# Patient Record
Sex: FEMALE | Race: BLACK OR AFRICAN AMERICAN
[De-identification: names, ages, dates, MRNs, and addresses within clinical notes are randomized per-mention and may not be internally consistent; named-entity substitution may affect disease eponyms.]

---

## 2018-04-07 ENCOUNTER — HOSPITAL ENCOUNTER (EMERGENCY)
Dept: HOSPITAL 74 - JER | Age: 28
Discharge: HOME | End: 2018-04-07
Payer: COMMERCIAL

## 2018-04-07 VITALS — SYSTOLIC BLOOD PRESSURE: 141 MMHG | TEMPERATURE: 98.9 F | DIASTOLIC BLOOD PRESSURE: 77 MMHG | HEART RATE: 86 BPM

## 2018-04-07 VITALS — BODY MASS INDEX: 28.7 KG/M2

## 2018-04-07 DIAGNOSIS — R35.0: ICD-10-CM

## 2018-04-07 DIAGNOSIS — O26.892: Primary | ICD-10-CM

## 2018-04-07 DIAGNOSIS — Z3A.18: ICD-10-CM

## 2018-04-07 LAB
APPEARANCE UR: CLEAR
BILIRUB UR STRIP.AUTO-MCNC: NEGATIVE MG/DL
COLOR UR: (no result)
KETONES UR QL STRIP: NEGATIVE
LEUKOCYTE ESTERASE UR QL STRIP.AUTO: NEGATIVE
NITRITE UR QL STRIP: NEGATIVE
PH UR: 8 [PH] (ref 5–8)
PROT UR QL STRIP: NEGATIVE
PROT UR QL STRIP: NEGATIVE
RBC # UR STRIP: NEGATIVE /UL
SP GR UR: 1.01 (ref 1–1.03)
UROBILINOGEN UR STRIP-MCNC: NEGATIVE MG/DL (ref 0.2–1)

## 2018-04-07 NOTE — PDOC
History of Present Illness





- General


History Source: Patient





- History of Present Illness


Timing/Duration: reports: other


Abdominal Pain Onset Location: reports: suprapubic





<Lilia Ruiz  Last Filed: 18 16:09>





<Lefty Ham - Last Filed: 18 15:01>





- General


Chief Complaint: Pain, Acute


Stated Complaint: 18 WEEKS PREGNANT/PAIN


Time Seen by Provider: 18 14:58





Past History





- Past Medical History


COPD: No


Other medical history: DENIES.





- Suicide/Smoking/Psychosocial Hx


Smoking History: Never smoked





<Lilia Ruiz  Last Filed: 18 16:09>





<Lefty Ham - Last Filed: 18 15:01>





- Past Medical History


Allergies/Adverse Reactions: 


 Allergies











Allergy/AdvReac Type Severity Reaction Status Date / Time


 


Penicillins Allergy Intermediate Hives Verified 18 13:25











Home Medications: 


Ambulatory Orders





NK [No Known Home Medication]  18 











**Review of Systems





- Review of Systems


Constitutional: No: Chills, Fever


ABD/GI: Yes: Abdominal cramping.  No: Constipated, Diarrhea, Nausea, Vomiting


: Yes: Frequency.  No: Burning, Dysuria, Discharge, Flank Pain, Hematuria





<Lilia Ruiz  Last Filed: 18 16:09>





*Physical Exam





- Vital Signs


 Last Vital Signs











Temp Pulse Resp BP Pulse Ox


 


 98.9 F   86   19   141/77   100 


 


 18 13:25  18 13:25  18 13:25  18 13:25  18 13:25














- Physical Exam


General Appearance: Yes: Appropriately Dressed.  No: Apparent Distress


HEENT: positive: Normal Voice


Neck: positive: Supple


Respiratory/Chest: negative: Respiratory Distress


Female Pelvic Exam: positive: other (refused pelvic)


Gastrointestinal/Abdominal: positive: Soft.  negative: Tender


Integumentary: positive: Dry, Warm


Neurologic: positive: Fully Oriented, Alert, Normal Mood/Affect





<Lilia Ruiz  Last Filed: 18 16:09>





- Vital Signs


 Last Vital Signs











Temp Pulse Resp BP Pulse Ox


 


 98.9 F   86   19   141/77   100 


 


 18 13:25  04/07/18 13:25  18 13:25  18 13:25  18 13:25














<Lefty Ham - Last Filed: 18 15:01>





ED Treatment Course





- ADDITIONAL ORDERS


Additional order review: 














 18 15:04 Urine Culture - Final





 Urine - Urine Clean Catch    NO GROWTH OBTAINED














- Medications


Given in the ED: 


ED Medications














Discontinued Medications














Generic Name Dose Route Start Last Admin





  Trade Name Trever  PRN Reason Stop Dose Admin


 


Acetaminophen  650 mg  18 15:03  18 15:12





  Tylenol -  PO  18 15:04  650 mg





  ONCE ONE   Administration














<Lefty Ham - Last Filed: 18 15:01>





Medical Decision Making





- Medical Decision Making





18 15:01


27-year-old female, , approximately 18 weeks by dates, has had prenatal 

care w/ US and no issues with pregnancy so far per pt, here with urinary 

frequency x 2 days.  Abdominal cramping pain radiating to back recorded at 

triage though patient denies this to me at this time.  No dysuria, vaginal 

bleeding, nausea, vomiting, fever or chills  





See exam





Urinary frequency in 2nd trimester preg


Has had prenatal care including US w/ no issues w/ preg so far per pt


Well nir and stable w/ benign abd, declines pelvic exam


-ua/cx pending











18 16:07


Urine negative for signs of infection.  Urine culture sent.  Patient feels well 

enough to be discharged with partner.  To contact her GYN on Monday. 











<Lilia Ruiz - Last Filed: 18 16:09>





- Medical Decision Making








18 15:01


The patient was seen and evaluated in conjunction with CAROLE Ruiz under my direct 

supervision, ancillary studies were reviewed.   I agree with the plan as 

outlined by CAROLE Ruiz. 





<Lefty Ham - Last Filed: 18 15:01>





*DC/Admit/Observation/Transfer





<Lilia Ruiz - Last Filed: 18 16:09>





<Lefty Ham - Last Filed: 18 15:01>


Diagnosis at time of Disposition: 


 Urinary frequency








- Discharge Dispostion


Disposition: HOME


Condition at time of disposition: Good





- Referrals


Referrals: 


Josiah Maria MD [Primary Care Provider] - 





- Patient Instructions


Printed Discharge Instructions:  Managing Symptoms of Pregnancy


Additional Instructions: 


Some urinary frequency is to be expected in pregnancy state as your uterus is 

resting on your bladder.


Your urine showed no signs of infection today.


Please follow-up with her GYN on Monday





- Post Discharge Activity

## 2018-11-26 ENCOUNTER — HOSPITAL ENCOUNTER (EMERGENCY)
Dept: HOSPITAL 74 - JERFT | Age: 28
Discharge: HOME | End: 2018-11-26
Payer: COMMERCIAL

## 2018-11-26 VITALS — BODY MASS INDEX: 30.9 KG/M2

## 2018-11-26 VITALS — TEMPERATURE: 99 F | HEART RATE: 74 BPM | SYSTOLIC BLOOD PRESSURE: 123 MMHG | DIASTOLIC BLOOD PRESSURE: 82 MMHG

## 2018-11-26 DIAGNOSIS — Z3A.01: ICD-10-CM

## 2018-11-26 DIAGNOSIS — K08.89: ICD-10-CM

## 2018-11-26 DIAGNOSIS — O99.89: Primary | ICD-10-CM

## 2018-11-26 NOTE — PDOC
History of Present Illness





- General


Chief Complaint: Toothache


Stated Complaint: Toothache


Time Seen by Provider: 11/26/18 19:39





- History of Present Illness


Initial Comments: 





11/26/18 20:34


28-year-old female without comorbidities 7 weeks pregnant presents for 

evaluation of painful tooth for the last 7 days without systemic symptoms





Past History





- Past Medical History


Allergies/Adverse Reactions: 


 Allergies











Allergy/AdvReac Type Severity Reaction Status Date / Time


 


Penicillins Allergy   Verified 11/26/18 19:40











Home Medications: 


Ambulatory Orders





Clindamycin [Cleocin -] 300 mg PO TID #21 capsule 11/26/18 








COPD: No





- Suicide/Smoking/Psychosocial Hx


Smoking History: Never smoked


Hx Alcohol Use: No


Drug/Substance Use Hx: No


Substance Use Type: None





**Review of Systems





- Review of Systems


Constitutional: No: Fever


HEENTM: Yes: Dental Problems





*Physical Exam





- Vital Signs


 Last Vital Signs











Temp Pulse Resp BP Pulse Ox


 


 99.0 F   74   18   123/82   100 


 


 11/26/18 19:38  11/26/18 19:38  11/26/18 19:38  11/26/18 19:38  11/26/18 19:38














- Physical Exam


Comments: 





11/26/18 20:35


HEAD: NC/AT


EYES: Conjuntiva clear


Ears: Canals and TM's normal


NOSE: No d/c


THROAT: Moist mucous membrances, oral pharanx clear, uvula midline; 


Poor dentition, right lower molar caries


NECK: Supple without adenopathy


CARDIAC: S1 S2


LUNGS: CTA Full and Equal breath sounds


ABDOMEN: Soft NT ND


MS: Full ROM in all joints without edema 


NEUROLOGIC: No gross sensory or motor deficits, NVID


SKIN: Normal color and temperature no lesions or rashes





*DC/Admit/Observation/Transfer


Diagnosis at time of Disposition: 


 Pain, dental








- Referrals


Referrals: 


Josiah Maria MD [Primary Care Provider] - 





- Patient Instructions


Printed Discharge Instructions:  DI for Dental Pain


Additional Instructions: 


Because of your pregnancy may only take Tylenol for pain. Please take the 

antibiotics as directed and finish the course continue with Tylenol as directed 

and follow-up with your dentist as scheduled return to the emergency room 

should symptoms worsen or go unresolved





- Post Discharge Activity

## 2018-11-26 NOTE — PDOC
Rapid Medical Evaluation


Time Seen by Provider: 11/26/18 19:39


Medical Evaluation: 


 Allergies











Allergy/AdvReac Type Severity Reaction Status Date / Time


 


Penicillins Allergy   Verified 04/08/18 09:59











11/26/18 19:39


Pt c/o: has rt lower quadrant dental pain, has appt next week, no fver, states 

pain is causing diff sleeping, took tylenol with no relief, 7 weeks pregnant


Pt on brief exam: no visable abscess 


pt ordered for: none


pt to proceed to the ED





**Discharge Disposition





- Diagnosis


 Pain, dental








- Referrals





- Patient Instructions





- Post Discharge Activity

## 2019-06-25 ENCOUNTER — HOSPITAL ENCOUNTER (EMERGENCY)
Dept: HOSPITAL 74 - JER | Age: 29
Discharge: ANOTHER HOSPITAL | End: 2019-06-25
Payer: COMMERCIAL

## 2019-06-25 VITALS — BODY MASS INDEX: 34 KG/M2

## 2019-06-25 VITALS — SYSTOLIC BLOOD PRESSURE: 132 MMHG | DIASTOLIC BLOOD PRESSURE: 83 MMHG

## 2019-06-25 VITALS — HEART RATE: 184 BPM

## 2019-06-25 VITALS — TEMPERATURE: 97.8 F

## 2019-06-25 DIAGNOSIS — O26.893: Primary | ICD-10-CM

## 2019-06-25 DIAGNOSIS — Z3A.38: ICD-10-CM

## 2019-06-25 DIAGNOSIS — I48.91: ICD-10-CM

## 2019-06-25 DIAGNOSIS — O99.413: ICD-10-CM

## 2019-06-25 LAB
ALBUMIN SERPL-MCNC: 2.6 G/DL (ref 3.4–5)
ALP SERPL-CCNC: 110 U/L (ref 45–117)
ALT SERPL-CCNC: 12 U/L (ref 13–61)
ANION GAP SERPL CALC-SCNC: 7 MMOL/L (ref 8–16)
APPEARANCE UR: CLEAR
AST SERPL-CCNC: 16 U/L (ref 15–37)
BASOPHILS # BLD: 0.9 % (ref 0–2)
BILIRUB SERPL-MCNC: 0.4 MG/DL (ref 0.2–1)
BILIRUB UR STRIP.AUTO-MCNC: NEGATIVE MG/DL
BUN SERPL-MCNC: 6 MG/DL (ref 7–18)
CALCIUM SERPL-MCNC: 8.7 MG/DL (ref 8.5–10.1)
CHLORIDE SERPL-SCNC: 110 MMOL/L (ref 98–107)
CO2 SERPL-SCNC: 24 MMOL/L (ref 21–32)
COLOR UR: YELLOW
CREAT SERPL-MCNC: 0.7 MG/DL (ref 0.55–1.3)
DEPRECATED RDW RBC AUTO: 14.6 % (ref 11.6–15.6)
EOSINOPHIL # BLD: 0.6 % (ref 0–4.5)
GLUCOSE SERPL-MCNC: 90 MG/DL (ref 74–106)
HCT VFR BLD CALC: 32.3 % (ref 32.4–45.2)
HGB BLD-MCNC: 10.8 GM/DL (ref 10.7–15.3)
INR BLD: 0.96 (ref 0.83–1.09)
KETONES UR QL STRIP: NEGATIVE
LEUKOCYTE ESTERASE UR QL STRIP.AUTO: NEGATIVE
LYMPHOCYTES # BLD: 23 % (ref 8–40)
MAGNESIUM SERPL-MCNC: 2.2 MG/DL (ref 1.8–2.4)
MCH RBC QN AUTO: 28 PG (ref 25.7–33.7)
MCHC RBC AUTO-ENTMCNC: 33.5 G/DL (ref 32–36)
MCV RBC: 83.5 FL (ref 80–96)
MONOCYTES # BLD AUTO: 9.6 % (ref 3.8–10.2)
NEUTROPHILS # BLD: 65.9 % (ref 42.8–82.8)
NITRITE UR QL STRIP: NEGATIVE
PH UR: 7.5 [PH] (ref 5–8)
PHOSPHATE SERPL-MCNC: 4.3 MG/DL (ref 2.5–4.9)
PLATELET # BLD AUTO: 181 K/MM3 (ref 134–434)
PMV BLD: 10.2 FL (ref 7.5–11.1)
POTASSIUM SERPLBLD-SCNC: 4.2 MMOL/L (ref 3.5–5.1)
PROT SERPL-MCNC: 6.2 G/DL (ref 6.4–8.2)
PROT UR QL STRIP: NEGATIVE
PROT UR QL STRIP: NEGATIVE
PT PNL PPP: 11.3 SEC (ref 9.7–13)
RBC # BLD AUTO: 3.87 M/MM3 (ref 3.6–5.2)
SODIUM SERPL-SCNC: 141 MMOL/L (ref 136–145)
SP GR UR: 1 (ref 1.01–1.03)
UROBILINOGEN UR STRIP-MCNC: 0.2 MG/DL (ref 0.2–1)
WBC # BLD AUTO: 7.9 K/MM3 (ref 4–10)

## 2019-06-25 PROCEDURE — 3E033GC INTRODUCTION OF OTHER THERAPEUTIC SUBSTANCE INTO PERIPHERAL VEIN, PERCUTANEOUS APPROACH: ICD-10-PCS | Performed by: EMERGENCY MEDICINE

## 2019-06-25 PROCEDURE — 3E0337Z INTRODUCTION OF ELECTROLYTIC AND WATER BALANCE SUBSTANCE INTO PERIPHERAL VEIN, PERCUTANEOUS APPROACH: ICD-10-PCS | Performed by: EMERGENCY MEDICINE

## 2019-06-25 NOTE — EKG
Test Reason : 

Blood Pressure : ***/*** mmHG

Vent. Rate : 165 BPM     Atrial Rate : 170 BPM

   P-R Int : 000 ms          QRS Dur : 070 ms

    QT Int : 286 ms       P-R-T Axes : 000 016 027 degrees

   QTc Int : 473 ms

 

ATRIAL FIBRILLATION WITH RAPID VENTRICULAR RESPONSE WITH PREMATURE

VENTRICULAR OR ABERRANTLY CONDUCTED COMPLEXES

INFERIOR INFARCT , AGE UNDETERMINED

ANTERIOR INFARCT , AGE UNDETERMINED

ABNORMAL ECG

NO PREVIOUS ECGS AVAILABLE

Confirmed by Yash Lyons MD ([id number]) on 6/25/2019 10:34:24 AM

 

Referred By:             Confirmed By:Yash Lyons MD

## 2019-06-25 NOTE — PDOC
Attending Attestation





- Resident


Resident Name: NealBelem





- ED Attending Attestation


I have performed the following: I have examined & evaluated the patient, The 

case was reviewed & discussed with the resident, I agree w/resident's findings 

& plan, Exceptions are as noted





- HPI


HPI: 





19 03:01


28 F , no PMH, presents to ED with palpitations and lightheadedness since 

11PM last night. Pt denies CP. Endorses some MORELOS but no SOB at rest. Denies leg 

swelling. Denies h/o afib.





- Physicial Exam


PE: 





19 03:06


"GENERAL: Awake, alert, and fully oriented, in no acute distress.


HEAD: No signs of trauma


EYES: PERRLA, EOMI, sclera anicteric, conjunctiva clear


ENT: Auricles normal inspection, hearing grossly normal, nares patent, 

oropharynx clear without exudates. Moist mucosa


NECK: Nontender, no stepoffs, Normal ROM, supple, no lymphadenopathy, JVD, or 

masses


LUNGS: Breath sounds equal, clear to auscultation bilaterally.  No wheezes, and 

no crackles


HEART: tachycardic, normal S1 and S2, no murmurs, rubs or gallops


ABDOMEN: Gravid, nontender, normoactive bowel sounds.  No guarding, no rebound.

  No masses


EXTREMITIES: Normal range of motion, no edema.  No clubbing or cyanosis. No 

cords, erythema, or tenderness


NEUROLOGICAL: Cranial nerves II through XII intact. 5/5 strength and sensation 

in all extremities, Normal speech, normal gait, normal cerebellar function


SKIN: Warm, Dry, normal turgor, no rashes or lesions noted.





- Critical Care Time


Total Critical Care Time: 60


Critical Care Statement: The care of this patient involved high complexity 

decision making to prevent further life threatening deterioration of the patient

's condition and/or to evaluate & treat vital organ system(s) failure or risk 

of failure.





- Medical Decision Making





19 03:06


28 F , @ 38 weeks, presenting with new onset afib.


- Labs


- IV fluids





Discussed treatment options with Dr. Aguila, who recommends beta blockers if 

BP can tolerate. Would also evaluate for peripartum cardiomyopathy.





19 03:54


Pt with trop 0.09


HR persistently elevated after 1L NS


WIll give 5mg metoprolol IV





19 04:18


HR now improved to 130s


Pt accepted to [facility] for high-risk OB





OB in ED to evaluate pt, fetal heart monitoring





Pt cleared by OB for transfer from their standpoint





Pt well appearing with stable BP at time of transfer to [facility]. HR ranging from 

130s-150s. Pt without chest pain/SOB.

## 2019-06-25 NOTE — PDOC
History of Present Illness





- General


Stated Complaint: PALPITATIONS


Time Seen by Provider: 19 01:47


History Source: Patient


Exam Limitations: No Limitations





- History of Present Illness


Initial Comments: 





19 01:49


28YOF who is  (4 prior elective abortions and one prematurely born live 

child one year old) and roughly 9 months pregnant, who p/w new onset 

lightheadedness, pre-syncope, rapid irregular pounding palpitations, and mild 

SOB, unlike anything she has experienced before. She has not taken medication 

for her symptoms. She denies any recent f/c/n/v/d/c, abdominal pain, chest pain

, leg pain/swelling, immobility, surgery, extra hormone use, long flights/drives

, or other symptoms.





Past History





- Past Medical History


Allergies/Adverse Reactions: 


 Allergies











Allergy/AdvReac Type Severity Reaction Status Date / Time


 


Penicillins Allergy Intermediate Hives Verified 19 02:06











Home Medications: 


Ambulatory Orders





NK [No Known Home Medication]  18 


Prenatal Vit/Iron Fum/Folic AC [Prenatal Tablet] 1 each PO DAILY 18 








COPD: No





- Suicide/Smoking/Psychosocial Hx


Smoking History: Never smoked





**Review of Systems





- Review of Systems


Able to Perform ROS?: Yes


Comments:: 





19 02:48


GEN: no fever, chills, malaise, generalized weakness, or weight change


HEENT: no ear pain, sore throat, vision change, or eye pain


CV: palpitations, lightheadedness, no chest pain, syncope, or edema


RESP: SOB, no cough, or wheezing


GI: no abdominal pain, nausea, vomiting, diarrhea, constipation, or white/black/

bloody stool


: no dysuria, hematuria, incontinence, retention, bleeding, or discharge 


MSK: no neck/back pain, muscle weakness/pain, or joint swelling/pain


NEURO: no headache, seizure, vertigo, numbness, tingling, or focal weakness


PSYCH: no substance use, no behavior change


SKIN: no jaundice, no rash


ROS otherwise negative except as noted in HPI





*Physical Exam





- Vital Signs


 Last Vital Signs











Temp Pulse Resp BP Pulse Ox


 


 97.8 F   184 H  22 H  132/83   100 


 


 19 01:43  19 02:55  19 02:55  19 04:04  19 02:55














- Physical Exam


Comments: 





19 02:49


GENERAL: a but uncomfortable but otherwise nontoxic and well-appearing, A/Ox4, 

no distress, answers questions appropriately


HEENT: PERRLA, EOMI, moist mucous membranes


NECK/BACK: no midline ttp, no spinal stepoff or deformity, no hematoma, full ROM

, neck supple


CARDIOVASCULAR: irregularly irregular and tachycardic, a bit hyperdynamic chest 

wall, normal S1S2, no MGR, strong peripheral pulses, capillary refill <2 seconds

, extremities wwp, no edema


LUNGS/RESPIRATORY: no respiratory distress, CTAB


GI/ABDOMEN: abdomen visibly gravid, symmetric side-to-side, normoactive BS, soft

, no ttp, no midline pulsatile masses


: no CVA tenderness


EXTREMITIES: no muscle atrophy, no acute deformity


SKIN: warm and dry, no pallor, no jaundice, no rash, no bruising, no skin 

breakdown, no cuts, no lesions


NEUROLOGICAL: GCS 15, CN II-XII grossly intact, 5/5 strength proximally and 

distally, no facial droop





**Heart Score/ECG Review


  ** #1





19 01:41


A-fib with RVR with rate of 165, normal axis, normal QTc, TWI in II, III, and V3

; TW flattening in aVF, no ischemic ST-T changes





ED Treatment Course





- LABORATORY


CBC & Chemistry Diagram: 


 19 02:30





 19 02:30





- ADDITIONAL ORDERS


Additional order review: 


 Laboratory  Results











  19





  02:30 02:30 02:30


 


PT with INR   


 


INR   


 


Sodium   141 


 


Potassium   4.2 


 


Chloride   110 H 


 


Carbon Dioxide   24 


 


Anion Gap   7 L 


 


BUN   6.0 L 


 


Creatinine   0.7 


 


Est GFR (CKD-EPI)AfAm   137.62 


 


Est GFR (CKD-EPI)NonAf   118.74 


 


Random Glucose   90 


 


Calcium   8.7 


 


Phosphorus   4.3 


 


Magnesium   2.2 


 


Total Bilirubin   0.4 


 


AST   16 


 


ALT   12 L 


 


Alkaline Phosphatase   110 


 


Creatine Kinase   109 


 


Troponin I   0.09 H 


 


B-Natriuretic Peptide  270.4 H  


 


Total Protein   6.2 L 


 


Albumin   2.6 L 


 


Urine Color    Yellow


 


Urine Appearance    Clear


 


Urine pH    7.5


 


Ur Specific Gravity    1.002 L


 


Urine Protein    Negative


 


Urine Glucose (UA)    Negative


 


Urine Ketones    Negative


 


Urine Blood    Negative


 


Urine Nitrite    Negative


 


Urine Bilirubin    Negative


 


Urine Urobilinogen    0.2


 


Ur Leukocyte Esterase    Negative














  19





  02:30


 


PT with INR  11.30


 


INR  0.96


 


Sodium 


 


Potassium 


 


Chloride 


 


Carbon Dioxide 


 


Anion Gap 


 


BUN 


 


Creatinine 


 


Est GFR (CKD-EPI)AfAm 


 


Est GFR (CKD-EPI)NonAf 


 


Random Glucose 


 


Calcium 


 


Phosphorus 


 


Magnesium 


 


Total Bilirubin 


 


AST 


 


ALT 


 


Alkaline Phosphatase 


 


Creatine Kinase 


 


Troponin I 


 


B-Natriuretic Peptide 


 


Total Protein 


 


Albumin 


 


Urine Color 


 


Urine Appearance 


 


Urine pH 


 


Ur Specific Gravity 


 


Urine Protein 


 


Urine Glucose (UA) 


 


Urine Ketones 


 


Urine Blood 


 


Urine Nitrite 


 


Urine Bilirubin 


 


Urine Urobilinogen 


 


Ur Leukocyte Esterase 








 











  19





  02:30


 


RBC  3.87


 


MCV  83.5


 


MCHC  33.5


 


RDW  14.6


 


MPV  10.2


 


Neutrophils %  65.9


 


Lymphocytes %  23.0


 


Monocytes %  9.6


 


Eosinophils %  0.6


 


Basophils %  0.9














- Medications


Given in the ED: 


ED Medications














Discontinued Medications














Generic Name Dose Route Start Last Admin





  Trade Name Freq  PRN Reason Stop Dose Admin


 


Metoprolol Tartrate  5 mg  19 03:54  19 04:04





  Lopressor Injection -  IVPUSH  19 03:55  5 mg





  ONCE ONE   Administration





     





     





     





     


 


Metoprolol Tartrate  25 mg  19 04:10  19 04:22





  Lopressor -  PO  19 04:11  25 mg





  ONCE ONE   Administration





     





     





     





     


 


Sodium Chloride  1,000 ml  19 01:48  19 02:40





  Normal Saline -  IV  19 01:49  1,000 ml





  ONCE ONE   Administration





     





     





     





     














Medical Decision Making





- Medical Decision Making


28 YOF who is roughly 9 months pregnant p/w new onset rapid palpitations since 

about 11:30 pm.





 Initial Vital Signs











Temp Pulse Resp BP Pulse Ox


 


 97.8 F   67   18   95/55 L  99 


 


 19 01:43  19 01:43  19 01:43  19 01:43  19 01:43








Exam: As noted in Physical Exam section.


DDX IBNLT: tachyarrhythmia (e.g. SVT, re-entrant tachycardia, WPW, Brugada, 

long QT, AF/AFL w/ RVR, MAT, ventricular dysrhythmia), ischemia (ACS), 

structural heart condition (MVP, mitral stenosis, atrial enlargement, HOCM), 

anxiety/panic, hypoxia, anemia (e.g. hemorrhage from heavy menstruation, 

ruptured ectopic, etc), PE, PTX, bronchitis/PNA, sepsis/shock, tamponade, 

metabolic (e.g. DKA, hypoglycemia), thyroid condition, catecholamine surge (

e.g. pheochromocytoma), anxiety/panic disorder, medication effect, substance use

, etc.


W/U ordered: Monitor EKG CXR CBCD CMP Mg Phos TSH Cardiac Panel UA UCx hCG


TX ordered: IV O2 Pacer pads applied IVF Vagal maneuvers





EKG: Reviewed; results as noted in ECG Review section.





*Narrow, irregular, no consistent p-wave: AF with RVR, AFL with variable 

conduction, MAT.


If MAT do not give antiarrhythmics, but treat underlying respiratory issues.


Stable: long-acting AVN blocker - diltiazem 15-20 mg/2 min, verapamil, beta-

blockers, digoxin, amiodarone.


Unstable: synchronized cardioversion -200 J, diltiazem, Mg, amiodarone





The Pt is stable currently (no AMS, pulmonary edema, chest pain, or sBP<90).


Therefore they do not require defibrillation or DC cardioversion at this time.


The symptoms started <48 hours ago and therefore the Pt does not require AC 

before cardioversion.


The symptoms started >48 hours ago and therefore the Pt requires 4 wks AC 

before cardioversion.





19 02:12


Spoke with Dr. Huang on-call with Cardiology, transmitted the EKG.


Recommends w/u for peripartum cardiomyopathy, calling cardiologist for [facility] for the official consult.





 Laboratory Tests











  19





  02:30 02:30 02:30


 


WBC  7.9  


 


RBC  3.87  


 


Hgb  10.8  


 


Hct  32.3 L  


 


MCV  83.5  


 


MCH  28.0  


 


MCHC  33.5  


 


RDW  14.6  


 


Plt Count  181  


 


MPV  10.2  


 


Absolute Neuts (auto)  5.2  


 


Neutrophils %  65.9  


 


Lymphocytes %  23.0  


 


Monocytes %  9.6  


 


Eosinophils %  0.6  


 


Basophils %  0.9  


 


Nucleated RBC %  0  


 


PT with INR   11.30 


 


INR   0.96 


 


Sodium   


 


Potassium   


 


Chloride   


 


Carbon Dioxide   


 


Anion Gap   


 


BUN   


 


Creatinine   


 


Est GFR (CKD-EPI)AfAm   


 


Est GFR (CKD-EPI)NonAf   


 


Random Glucose   


 


Calcium   


 


Phosphorus   


 


Magnesium   


 


Total Bilirubin   


 


AST   


 


ALT   


 


Alkaline Phosphatase   


 


Creatine Kinase   


 


Troponin I   


 


B-Natriuretic Peptide   


 


Total Protein   


 


Albumin   


 


Urine Color    Yellow


 


Urine Appearance    Clear


 


Urine pH    7.5


 


Ur Specific Gravity    1.002 L


 


Urine Protein    Negative


 


Urine Glucose (UA)    Negative


 


Urine Ketones    Negative


 


Urine Blood    Negative


 


Urine Nitrite    Negative


 


Urine Bilirubin    Negative


 


Urine Urobilinogen    0.2


 


Ur Leukocyte Esterase    Negative














  19





  02:30 02:30


 


WBC  


 


RBC  


 


Hgb  


 


Hct  


 


MCV  


 


MCH  


 


MCHC  


 


RDW  


 


Plt Count  


 


MPV  


 


Absolute Neuts (auto)  


 


Neutrophils %  


 


Lymphocytes %  


 


Monocytes %  


 


Eosinophils %  


 


Basophils %  


 


Nucleated RBC %  


 


PT with INR  


 


INR  


 


Sodium  141 


 


Potassium  4.2 


 


Chloride  110 H 


 


Carbon Dioxide  24 


 


Anion Gap  7 L 


 


BUN  6.0 L 


 


Creatinine  0.7 


 


Est GFR (CKD-EPI)AfAm  137.62 


 


Est GFR (CKD-EPI)NonAf  118.74 


 


Random Glucose  90 


 


Calcium  8.7 


 


Phosphorus  4.3 


 


Magnesium  2.2 


 


Total Bilirubin  0.4 


 


AST  16 


 


ALT  12 L 


 


Alkaline Phosphatase  110 


 


Creatine Kinase  109 


 


Troponin I  0.09 H 


 


B-Natriuretic Peptide   270.4 H


 


Total Protein  6.2 L 


 


Albumin  2.6 L 


 


Urine Color  


 


Urine Appearance  


 


Urine pH  


 


Ur Specific Gravity  


 


Urine Protein  


 


Urine Glucose (UA)  


 


Urine Ketones  


 


Urine Blood  


 


Urine Nitrite  


 


Urine Bilirubin  


 


Urine Urobilinogen  


 


Ur Leukocyte Esterase  








19 04:10


Patient's HR from 160s-210s, BP in the 120s/80s.


5 mg metoprolol IVPUSH given, subsequently HR in the 120s-130s, BP is 136/83.


25 mg metoprolol PO ordered.


[facility] transfer center was called, connected with Dr. Hummel with OB floor, 

patient accepted in xfer.


ED will fax face sheet.


[facility] L&D called and kindly comes down for fetal assessment.





19 04:40


Pt to be transferred to [facility].


Pt accepted in transfer to Dr. Hummel


Patient informed and consents to transfer.


Transfer paperwork completed and signed by all indicated parties.


EMS crew arrives and transfers Pt to ambulance without issue.





*DC/Admit/Observation/Transfer


Diagnosis at time of Disposition: 


 Atrial fibrillation with rapid ventricular response, Third trimester pregnancy








- Discharge Dispostion


Disposition: TRANSFER ACUTE CARE/OTHER HOSP


Condition at time of disposition: Guarded





- Referrals


Referrals: 


Josiah Maria MD [Primary Care Provider] - 





- Patient Instructions





- Post Discharge Activity





- Transfer to Acute Care Facility


Receiving Facility: [facility] ([facility])


Accepting Physician:: Dr. Hummel

## 2019-06-25 NOTE — PN
Progress Note (short form)





- Note


Progress Note: 





Patient evaluated prior to transfer from ER due to A-fib. She reports irregular 

contractions, no LOF, no VB, and reports +FM.


Patient reports H/O cerclage removed recently and denies any other issues with 

the baby


vitals: as recorded


NST: AGA, moderate/minimal variability, no Accels, no decels


toco: occasional


Bedside sono: cephalic, anterior placenta, +FM, MVP 4cm


Pelvic: declined by patient


A/P: 27 y/o @ 38wks+, no available prenatal summary as she is an external 

patient, abnormal cardiac rhythm, no evidence of active labor despite declining 

pelvic exam, Reassuring fetal status. Patient is OK for transfer from an 

obstetrical stand point.

## 2019-09-01 ENCOUNTER — HOSPITAL ENCOUNTER (EMERGENCY)
Dept: HOSPITAL 74 - JER | Age: 29
Discharge: HOME | End: 2019-09-01
Payer: COMMERCIAL

## 2019-09-01 VITALS — DIASTOLIC BLOOD PRESSURE: 90 MMHG | SYSTOLIC BLOOD PRESSURE: 148 MMHG | HEART RATE: 67 BPM | TEMPERATURE: 98.3 F

## 2019-09-01 VITALS — BODY MASS INDEX: 30.1 KG/M2

## 2019-09-01 DIAGNOSIS — K08.89: ICD-10-CM

## 2019-09-01 DIAGNOSIS — K08.109: ICD-10-CM

## 2019-09-01 DIAGNOSIS — G89.18: Primary | ICD-10-CM

## 2019-09-01 NOTE — PDOC
History of Present Illness





- General


Chief Complaint: Toothache


Stated Complaint: TOOTHACHE


Time Seen by Provider: 19 11:46


History Source: Patient


Exam Limitations: No Limitations





- History of Present Illness


Initial Comments: 





19 12:02





HISTORY OF PRESENT ILLNESS: 28-year-old woman who is status post tooth 

extraction one week ago presents to the ER with severe pain to extraction site. 

Patient had tooth #2 removal week ago. Patient attempted to call her oral 

surgeon but was unable to get through this weekend. Patient has been taking 

ibuprofen with some moderate relief of symptoms but the relief only lasts 

approximately 1-2 hours. Patient has been taking clindamycin as previously 

prescribed.





No recent travel or sick contacts. 


PAST MEDICAL HISTORY: Denies past medical history


SURGICAL HISTORY: Denies


ALLERGIES: No known drug allergies





REVIEW OF SYSTEMS


General/Constitutional: Denies fever or chills. Denies weakness, weight change.


HEENT: see HPI


Cardiovascular: Denies chest pain or shortness of breath.


Respiratory: Denies cough, wheezing, or hemoptysis.


Gastrointestinal: Denies nausea, vomiting, diarrhea or constipation. Denies 

rectal bleeding.


Genitourinary: Denies dysuria, frequency, or change in urination.


Musculoskeletal: Denies joint or muscle swelling or pain. Denies neck or back 

pain.


Skin and breasts: Denies rash or easy bruising.


Neurologic: Denies headache, vertigo, loss of consciousness, or loss of 

sensation.


Psychiatric: Denies depression or anxiety.


Endocrine: Denies increased thirst. Denies abnormal weight change.


Hematologic/Lymphatic: Denies anemia, easy bleeding, or history of blood clots.


Allergic/Immunologic: Denies hives or skin allergy. Denies latex allergy.











PHYSICAL EXAM


General Appearance: Well-appearing, appropriately dressed.  No apparent distress

, no intoxication.


HEENT: EOMI, PERRLA, normal ENT inspection, normal voice, TMs normal, pharynx 

normal.  No conjunctival pallor.  No photophobia, scleral icterus. Extraction 

site at tooth #2 appears well-healed. No discharge or drainage is present. 

Unable to visualize any exposed bone.


Neck: Supple.  Trachea midline. No tenderness, rigidity, carotid bruit, stridor

, lymphadenopathy, or thyromegaly. 


Respiratory/Chest: Lungs CTAB.  No shortness of breath, chest tenderness, 

respiratory distress, accessory muscle use. No crackles, rales, rhonchi, stridor

, wheezing, dullness


Cardiovascular: RRR. S1, S2.  No JVD, murmur, bradycardia, tachycardia.


19 12:04








Past History





- Past Medical History


Allergies/Adverse Reactions: 


 Allergies











Allergy/AdvReac Type Severity Reaction Status Date / Time


 


Penicillins Allergy Intermediate Hives Verified 19 11:44











Home Medications: 


Ambulatory Orders





Aspirin [ASA -] 81 mg PO DAILY 19 


Oxycodone HCl/Acetaminophen [Percocet 5-325 mg Tablet] 1 tab PO Q6H #5 tablet 

MDD 4 19 








COPD: No





- Reproductive History


 (#): 6


Para: 1


Therapeutic (s) & number: Yes (4)





- Suicide/Smoking/Psychosocial Hx


Smoking History: Current every day smoker


Have you smoked in the past 12 months: No


Information on smoking cessation initiated: No


Hx Alcohol Use: No


Drug/Substance Use Hx: No





*Physical Exam





- Vital Signs


 Last Vital Signs











Temp Pulse Resp BP Pulse Ox


 


 98.3 F   67   18   148/90   99 


 


 19 11:41  19 11:41  19 11:41  19 11:41  19 11:41














Medical Decision Making





- Medical Decision Making





19 12:01


A/P:


28-year-old woman with post operative dental pain status post tooth extraction





No evidence of a dry socket present


Discharge home with prescription for 5 Percocet. Patient has been instructed to 

follow-up with her oral surgeon for continued evaluation.





Portions of this note have been documented using voice recognition software. As 

a result, errors may occur in the transcription process. Effort has been made 

to correct all grammatical and transcription error, but some may have been 

missed.





*DC/Admit/Observation/Transfer


Diagnosis at time of Disposition: 


 Post-op pain








- Discharge Dispostion


Disposition: HOME


Condition at time of disposition: Stable


Decision to Admit order: No





- Prescriptions


Prescriptions: 


Oxycodone HCl/Acetaminophen [Percocet 5-325 mg Tablet] 1 tab PO Q6H #5 tablet 

MDD 4





- Referrals





- Patient Instructions


Additional Instructions: 


Chew on a used teabag or cotton ball soaked in clove oil to help with her pain.


Continue to take ibuprofen as previously directed.


Continue antibiotics as previously prescribed.


You have given a prescription for stronger pain medicine only for 5 doses. If 

you need more of this medication, you need to see the oral surgeon who 

performed your procedure.


Thank you very much for choosing us to provide your emergent health care needs.





- Post Discharge Activity

## 2019-10-27 ENCOUNTER — HOSPITAL ENCOUNTER (EMERGENCY)
Dept: HOSPITAL 74 - JER | Age: 29
Discharge: HOME | End: 2019-10-27
Payer: COMMERCIAL

## 2019-10-27 VITALS — HEART RATE: 69 BPM | DIASTOLIC BLOOD PRESSURE: 71 MMHG | SYSTOLIC BLOOD PRESSURE: 116 MMHG | TEMPERATURE: 98.5 F

## 2019-10-27 VITALS — BODY MASS INDEX: 32.8 KG/M2

## 2019-10-27 DIAGNOSIS — I48.0: ICD-10-CM

## 2019-10-27 DIAGNOSIS — B34.9: Primary | ICD-10-CM

## 2019-10-27 DIAGNOSIS — Z88.0: ICD-10-CM

## 2019-10-27 DIAGNOSIS — I10: ICD-10-CM

## 2019-10-27 NOTE — PDOC
History of Present Illness





- History of Present Illness


Initial Comments: 





10/27/19 08:30


Pt is a 29 y/o F with a significant past medical history of preeclampsia, HTN, 

and paroxysmal a-fib (on metoprolol) who presents to our Emergency Department 

this morning due to a 1 day duration of flu-like symptoms. Pt endorses that she 

woke up yesterday with a diffuse headache and a sore throat; pt also states she 

has generalized weakness. Pt works at a Pre-[employer] and endorses she has been 

surrounded by children who have been sick. Denies chest pain, shortness of 

breath, vomiting, lightheadedness, or abdominal pain.


SurgHx- 


SocialHx- Denies smoking, alcohol use, or illicit drug use.


Allergies: Penicillin 


10/27/19 08:31

















<Jd Maria - Last Filed: 10/27/19 22:54>





<Amanda Thurston - Last Filed: 10/30/19 07:42>





- General


Chief Complaint: Respiratory


Stated Complaint: FLU LIKE SYMPTOMS





Past History





- Past Medical History


COPD: No





- Reproductive History


 (#): 6


Para: 1


Therapeutic (s) & number: Yes (4)





- Psycho Social/Smoking Cessation Hx


Smoking History: Never smoked


Have you smoked in the past 12 months: No


Hx Alcohol Use: No


Drug/Substance Use Hx: No





<Jd Maria - Last Filed: 10/27/19 22:54>





<Amanda Thurston - Last Filed: 10/30/19 07:42>





- Past Medical History


Allergies/Adverse Reactions: 


 Allergies











Allergy/AdvReac Type Severity Reaction Status Date / Time


 


Penicillins Allergy Intermediate Hives Verified 10/27/19 07:34











Home Medications: 


Ambulatory Orders





Aspirin 81 mg PO DAILY 10/27/19 


Metoprolol Succinate [Toprol Xl] 100 mg PO DAILY 10/27/19 


Nifedipine [Nifedipine ER] mg PO DAILY 10/27/19 











**Review of Systems





- Review of Systems


Constitutional: Yes: Malaise, Weakness.  No: Fever


Respiratory: Yes: Cough, Productive cough


Cardiac (ROS): No: Chest Pain, Irregular Heart Rate, Palpitations


ABD/GI: Yes: Nausea.  No: Abdominal Distended


Neurological: Yes: Headache





<Jd Maria - Last Filed: 10/27/19 22:54>





- Review of Systems


Able to Perform ROS?: Yes


HEENTM: Yes: See HPI, Ear Pain, Nose Congestion, Throat Pain, Mouth Pain


Musculoskeletal: Yes: See HPI.  No: Back Pain


Integumentary: Yes: See HPI.  No: Change in Color, Rash


Neurological: Yes: See HPI


Hematologic/Lymphatic: Yes: See HPI.  No: Blood Clots, Easy Bleeding, Easy 

Bruising


All Other Systems: Reviewed and Negative





<Amanda Thurston - Last Filed: 10/30/19 07:42>





*Physical Exam





- Vital Signs


 Last Vital Signs











Temp Pulse Resp BP Pulse Ox


 


 98.5 F   69   18   116/71   99 


 


 10/27/19 07:30  10/27/19 07:30  10/27/19 07:30  10/27/19 07:30  10/27/19 07:30














- Physical Exam


Comments: 





10/27/19 22:49


AAOx3, NAD


EOMi Sclera Clear. No tonsilar exudates or erythema appreciated. Tender 

cervical lymph nodes, no palpable adenopathy appreciated. 


CTA b/l


RRR S1S2








<Candace,Jd - Last Filed: 10/27/19 22:54>





- Vital Signs


 Last Vital Signs











Temp Pulse Resp BP Pulse Ox


 


 98.5 F   69   18   116/71   99 


 


 10/27/19 07:30  10/27/19 07:30  10/27/19 07:30  10/27/19 07:30  10/27/19 07:30














- Physical Exam


General Appearance: Yes: Nourished, Appropriately Dressed.  No: Apparent 

Distress


HEENT: positive: EOMI, ANIBAL, Normal ENT Inspection, Normal Voice, Symmetrical, 

TMs Normal, Pharynx Normal, Rhinorrhea, Hearing Grossly Normal.  negative: 

Tonsillar Exudate, Tonsillar Erythema, Sinus Tenderness


Neck: positive: Tender, Trachea midline, Supple


Respiratory/Chest: positive: Chest Tender, Lungs Clear, Normal Breath Sounds.  

negative: Respiratory Distress


Cardiovascular: positive: Regular Rhythm, Regular Rate


Gastrointestinal/Abdominal: positive: Soft.  negative: Tender


Musculoskeletal: positive: Normal Inspection


Extremity: positive: Normal Capillary Refill, Normal Inspection, Normal Range 

of Motion


Integumentary: positive: Normal Color, Dry, Warm.  negative: Rash


Neurologic: positive: Alert





<Amanda Thurston - Last Filed: 10/30/19 07:42>





ED Treatment Course





- ADDITIONAL ORDERS


Additional order review: 














 10/27/19 08:49 Throat Culture - Final





 Throat    NO BETA HEMOLYTIC STREPTOCOCCI ISOLATED














- Medications


Given in the ED: 


ED Medications














Discontinued Medications














Generic Name Dose Route Start Last Admin





  Trade Name Trever  PRN Reason Stop Dose Admin


 


Acetaminophen  650 mg  10/27/19 08:55  10/27/19 08:57





  Tylenol -  PO  10/27/19 08:56  650 mg





  ONCE ONE   Administration





     





     





     





     


 


Ibuprofen  400 mg  10/27/19 08:54  10/27/19 08:57





  Motrin Oral Suspension -  PO  10/27/19 08:55  400 mg





  ONCE ONE   Administration





     





     





     





     














<Amanda Thurston - Last Filed: 10/30/19 07:42>





Medical Decision Making





- Medical Decision Making





10/27/19 08:30


Will order rapid influenza testing, rapid strep throat testing


DDX including but not limited to URI, Influenza, Strep throat 


10/27/19 08:55


Rapid Strep and Influenza both negative. Most likely Viral in origin. 


Will discharge with instructions to increase fluid intake and see PMD this 

week. 


10/27/19 08:57














<Jd Maria - Last Filed: 10/27/19 22:54>





Discharge





- Discharge Information


Problems reviewed: Yes





- Admission


No





<Jd Maria - Last Filed: 10/27/19 22:54>





<Amanda Thurston - Last Filed: 10/30/19 07:42>





- Discharge Information


Clinical Impression/Diagnosis: 


 Viral illness





Condition: Stable


Disposition: HOME





- Follow up/Referral


Referrals: 


Josiah Maria MD [Primary Care Provider] - 





- Patient Discharge Instructions


Patient Printed Discharge Instructions:  DI for Viral Syndrome





- Post Discharge Activity


Work/Back to School Note:  Back to Work

## 2019-10-27 NOTE — PDOC
Attending Attestation





- Resident


Resident Name: dJ Maria





- ED Attending Attestation


I have performed the following: I have examined & evaluated the patient, The 

case was reviewed & discussed with the resident, I agree w/resident's findings 

& plan





- HPI


HPI: 





10/27/19 09:02


29 y/o F with a significant past medical history of preeclampsia, HTN, and 

paroxysmal a-fib on metoprolol who presents to our Emergency Department this 

morning due to a 1 day duration of upper respiratory Pt endorses that she woke 

up yesterday with a diffuse headache "all over" described as pressure-like and 

shooting, a/w nasal congestion, sore throat, neck pain, b/l ear pain. Pt works 

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 

Denies fevers, chest pain, shortness of breath, vomiting, lightheadedness, 

abdominal pain,





- Physicial Exam


PE: 





10/27/19 08:58


Agree with the resident's HPI and PE as documented in the electronic medical 

record.


NAD, well appearing, NCAT, EOMI, PERRL, clear conjunctiva, anicteric, moist 

mucus membranes, oropharynx clear. Airway patent, normal phonation. Uvula 

midline. no tonsillar hypertrophy. No sinus tenderness, TM clear, no pinna 

tenderness to manipulation. b/l submandibular tenderness.


neck supple. lungs clear, RRR, abdomen soft nontender. No rebound, no guarding. 

Back nontender. PAT x4, no focal neuro deficits. No peripheral edema. normal 

color for ethnicity, WWP.








- Medical Decision Making





10/27/19 09:08


Vital Signs











Temp Pulse Resp BP Pulse Ox


 


 98.5 F   69   18   116/71   99 


 


 10/27/19 07:30  10/27/19 07:30  10/27/19 07:30  10/27/19 07:30  10/27/19 07:30








Vitals reviewed within normal limits.  No fevers, no systemic findings.  Airway 

is patent.  Differential diagnosis includes influenza, viral syndrome, upper 

respiratory infection, otitis media, pharyngitis, strep pharyngitis versus 

viral pharyngitis.  Clinically does not appear to have signs or symptoms 

suggestive of meningitis, no meningismus, neck is supple, neurologically 

intact.  Influenza swab is negative, strep test is also negative, follow-up on 

throat cultures.  Patient was given analgesia here, tolerating p.o. intake.  

Instructed this is most likely upper respiratory infection and continue with 

supportive care, hydration.  In addition advised supportive measurements such 

as OTC analgesia such as NSAID/Tylenol, lemon tea, warm tea, salt water gargles 

and cough drops for analgesia in the posterior oropharynx.  Discharged in 

stable condition, return precautions advised, follow PMD, work note is given.  

Hand hygiene and avoid sick contacts.

## 2020-02-08 ENCOUNTER — HOSPITAL ENCOUNTER (INPATIENT)
Dept: HOSPITAL 74 - JER | Age: 30
LOS: 1 days | Discharge: HOME | DRG: 201 | End: 2020-02-09
Attending: INTERNAL MEDICINE | Admitting: INTERNAL MEDICINE
Payer: COMMERCIAL

## 2020-02-08 VITALS — TEMPERATURE: 98.7 F

## 2020-02-08 VITALS — BODY MASS INDEX: 32.1 KG/M2

## 2020-02-08 DIAGNOSIS — Z88.0: ICD-10-CM

## 2020-02-08 DIAGNOSIS — I48.91: ICD-10-CM

## 2020-02-08 DIAGNOSIS — R79.89: ICD-10-CM

## 2020-02-08 DIAGNOSIS — R07.9: ICD-10-CM

## 2020-02-08 DIAGNOSIS — I10: ICD-10-CM

## 2020-02-08 DIAGNOSIS — E66.9: ICD-10-CM

## 2020-02-08 DIAGNOSIS — F41.0: ICD-10-CM

## 2020-02-08 DIAGNOSIS — R00.2: Primary | ICD-10-CM

## 2020-02-08 LAB
ALBUMIN SERPL-MCNC: 3.9 G/DL (ref 3.4–5)
ALP SERPL-CCNC: 62 U/L (ref 45–117)
ALT SERPL-CCNC: 20 U/L (ref 13–61)
ANION GAP SERPL CALC-SCNC: 4 MMOL/L (ref 8–16)
APTT BLD: 36 SECONDS (ref 25.2–36.5)
AST SERPL-CCNC: 15 U/L (ref 15–37)
BASOPHILS # BLD: 1.1 % (ref 0–2)
BILIRUB SERPL-MCNC: 0.3 MG/DL (ref 0.2–1)
BUN SERPL-MCNC: 7 MG/DL (ref 7–18)
CALCIUM SERPL-MCNC: 8.7 MG/DL (ref 8.5–10.1)
CHLORIDE SERPL-SCNC: 108 MMOL/L (ref 98–107)
CO2 SERPL-SCNC: 27 MMOL/L (ref 21–32)
CREAT SERPL-MCNC: 0.7 MG/DL (ref 0.55–1.3)
DEPRECATED RDW RBC AUTO: 13.1 % (ref 11.6–15.6)
EOSINOPHIL # BLD: 1.1 % (ref 0–4.5)
GLUCOSE SERPL-MCNC: 102 MG/DL (ref 74–106)
HCT VFR BLD CALC: 36.8 % (ref 32.4–45.2)
HGB BLD-MCNC: 12.2 GM/DL (ref 10.7–15.3)
INR BLD: 1.03 (ref 0.83–1.09)
LYMPHOCYTES # BLD: 44.9 % (ref 8–40)
MCH RBC QN AUTO: 28.1 PG (ref 25.7–33.7)
MCHC RBC AUTO-ENTMCNC: 33.2 G/DL (ref 32–36)
MCV RBC: 84.6 FL (ref 80–96)
MONOCYTES # BLD AUTO: 9.1 % (ref 3.8–10.2)
NEUTROPHILS # BLD: 43.8 % (ref 42.8–82.8)
PLATELET # BLD AUTO: 297 K/MM3 (ref 134–434)
PMV BLD: 9.4 FL (ref 7.5–11.1)
POTASSIUM SERPLBLD-SCNC: 3.8 MMOL/L (ref 3.5–5.1)
PROT SERPL-MCNC: 7.5 G/DL (ref 6.4–8.2)
PT PNL PPP: 12.1 SEC (ref 9.7–13)
RBC # BLD AUTO: 4.35 M/MM3 (ref 3.6–5.2)
SODIUM SERPL-SCNC: 139 MMOL/L (ref 136–145)
WBC # BLD AUTO: 6.5 K/MM3 (ref 4–10)

## 2020-02-08 PROCEDURE — G0378 HOSPITAL OBSERVATION PER HR: HCPCS

## 2020-02-08 NOTE — PDOC
History of Present Illness





- General


Chief Complaint: Chest Pain


Stated Complaint: PALPITATIONS


Time Seen by Provider: 20 21:15


History Source: Patient


Exam Limitations: No Limitations





- History of Present Illness


Initial Comments: 





20 21:32


Patient is a 29 year old female with h/o A-Fib, HTN during pregnancy c/o 

palpitations - irreg heart beat, tightness in the throat and occiput 

intermittenly since this morning.  States was up working in the house today and 

in her usual state of health however when she went to lay down was when the 

symptoms started.  States when she was pregnant she was diagnosed with atrial 

fibrillation and hypertension which was refractory to metoprolol so was placed 

on nifedipine.  Patient states that she has not taken her nifedipine for the 

past 4 days because she has been on an herbal blend and her blood pressure has 

been normal.  Last saw the cardiologist 4 months ago.  Patient states that 

today also had tightness which is 6/10 in her chest and in her shoulder 

radiates down the left arm with a numbness in the left hand and was associated 

with shortness of breath.  Family history negative for CVA/MI/PE/DVT, no OCP, (+

) IUD.  Denies fever, chills.








PMD:  Dr. Josiah Maria


CARDIO:  Dr. Garcia [phone]


PMHX: As above


PSOCHX:  neg cig, drug, etoh


ALL:  PCN -  rash





GENERAL/CONSTITUTIONAL: [No fever or chills. No weakness. No weight change.]


HEAD, EYES, EARS, NOSE AND THROAT: [No change in vision. No ear pain or 

discharge. No sore throat.]


CARDIOVASCULAR: [(+) chest pain or shortness of breath.]


RESPIRATORY: [No cough, wheezing, or hemoptysis.]


GASTROINTESTINAL: [No nausea, vomiting, diarrhea or constipation. No rectal 

bleeding.]


GENITOURINARY: [No dysuria, frequency, or change in urination.]


MUSCULOSKELETAL: [No joint or muscle swelling or pain. No neck or back pain.]


SKIN AND BREASTS: [No rash or easy bruising.]


NEUROLOGIC: [No headache, vertigo, loss of consciousness, or loss of sensation.]


PSYCHIATRIC: [No depression or anxiety.]


ENDOCRINE: [No increased thirst. No abnormal weight change.]


HEMATOLOGIC/LYMPHATIC: [No anemia, easy bleeding, or history of blood clots.]


ALLERGIC/IMMUNOLOGIC: [No hives or skin allergy. No latex allergy.]





GENERAL: [The patient is awake, alert, and fully oriented, in no acute distress.

]


HEAD: [Normal with no signs of trauma.]


EYES: [Pupils equal, round and reactive to light, extraocular movements intact, 

sclera anicteric, conjunctiva clear.]


ENT: [Ears normal, nares patent, oropharynx clear without exudates.  Moist 

mucous membranes.]


NECK: [Normal range of motion, supple without lymphadenopathy, JVD, or masses.]


LUNGS: [Breath sounds equal, clear to auscultation bilaterally.  No wheezes, 

and no crackles.]


HEART: [Irregular rate and rhythm, normal S1 and S2 without murmur, rub.]


ABDOMEN: [Soft, nontender, normoactive bowel sounds.  No guarding, no rebound.  

No masses.]


EXTREMITIES: [Normal range of motion, no edema.  No clubbing or cyanosis. No 

cords, erythema, or tenderness.]


NEUROLOGICAL: [Cranial nerves II through XII grossly intact.  Normal speech, 

normal gait.]


PSYCH: [Normal mood, normal affect.]


SKIN: [Warm, Dry, normal turgor, no rashes or lesions noted.]











Past History





- Past Medical History


Allergies/Adverse Reactions: 


 Allergies











Allergy/AdvReac Type Severity Reaction Status Date / Time


 


Penicillins Allergy Intermediate Hives Verified 20 21:05











Home Medications: 


Ambulatory Orders





Nifedipine [Nifedipine ER] 60 mg PO DAILY 20 








COPD: No


HTN: Yes


Other medical history: AFIB





- Reproductive History


 (#): 6


Para: 1


Therapeutic (s) & number: Yes (4)





- Psycho Social/Smoking Cessation Hx


Smoking History: Never smoked


Have you smoked in the past 12 months: No


Hx Alcohol Use: No


Drug/Substance Use Hx: No





*Physical Exam





- Vital Signs


 Last Vital Signs











Temp Pulse Resp BP Pulse Ox


 


 98.7 F   72   18   136/67   100 


 


 20 19:16  20 22:37  20 22:37  20 22:37  20 22:37














ED Treatment Course





- LABORATORY


CBC & Chemistry Diagram: 


 20 22:00





 20 22:00





- ADDITIONAL ORDERS


Additional order review: 


 Laboratory  Results











  20





  22:00 22:00 22:00


 


PT with INR   12.10 


 


INR   1.03 


 


PTT (Actin FS)   36.0 


 


D-Dimer    728 H


 


Sodium   


 


Potassium   


 


Chloride   


 


Carbon Dioxide   


 


Anion Gap   


 


BUN   


 


Creatinine   


 


Est GFR (CKD-EPI)AfAm   


 


Est GFR (CKD-EPI)NonAf   


 


Random Glucose   


 


Calcium   


 


Total Bilirubin   


 


AST   


 


ALT   


 


Alkaline Phosphatase   


 


Creatine Kinase   


 


Troponin I   


 


Total Protein   


 


Albumin   


 


Beta HCG, Quant   


 


Serum Pregnancy, Qual  Positive  














  20





  22:00


 


PT with INR 


 


INR 


 


PTT (Actin FS) 


 


D-Dimer 


 


Sodium  139


 


Potassium  3.8


 


Chloride  108 H


 


Carbon Dioxide  27


 


Anion Gap  4 L


 


BUN  7.0


 


Creatinine  0.7


 


Est GFR (CKD-EPI)AfAm  136.66


 


Est GFR (CKD-EPI)NonAf  117.91


 


Random Glucose  102


 


Calcium  8.7


 


Total Bilirubin  0.3


 


AST  15


 


ALT  20


 


Alkaline Phosphatase  62


 


Creatine Kinase  138


 


Troponin I  < 0.02


 


Total Protein  7.5


 


Albumin  3.9


 


Beta HCG, Quant  50.5


 


Serum Pregnancy, Qual 








 











  20





  22:00


 


RBC  4.35


 


MCV  84.6


 


MCHC  33.2


 


RDW  13.1  D


 


MPV  9.4


 


Neutrophils %  43.8  D


 


Lymphocytes %  44.9 H D


 


Monocytes %  9.1


 


Eosinophils %  1.1  D


 


Basophils %  1.1














- Medications


Given in the ED: 


ED Medications














Discontinued Medications














Generic Name Dose Route Start Last Admin





  Trade Name Freq  PRN Reason Stop Dose Admin


 


Aspirin  325 mg  20 21:51  20 22:06





  Asa -  PO  20 21:52  325 mg





  ONCE ONE   Administration





     





     





     





     














Medical Decision Making





- Medical Decision Making





20 21:32


Patient is a 29 year old female with h/o A-Fib, HTN during pregnancy c/o 

palpitations - irreg heart beat, tightness in the throat and occiput 

intermittenly since this morning.  States was up working in the house today and 

in her usual state of health however when she went to lay down was when the 

symptoms started.  States when she was pregnant she was diagnosed with atrial 

fibrillation and hypertension which was refractory to metoprolol so was placed 

on nifedipine.  Patient states that she has not taken her nifedipine for the 

past 4 days because she has been on an herbal blend and her blood pressure has 

been normal.  Last saw the cardiologist 4 months ago.  Patient states that 

today also had tightness which is 6/10 in her chest and in her shoulder 

radiates down the left arm with a numbness in the left hand and was associated 

with shortness of breath.  Family history negative for CVA/MI/PE/DVT, no OCP, (+

) IUD.  Denies fever, chills.





Patient with chest pain with EKG changes.  Will transfer care to the main ER 

for admission.


EKG: SR rate 66, NAD, T wave inversion V1 through V4 which is a change from 

prior of 











Discharge





- Discharge Information


Problems reviewed: Yes


Clinical Impression/Diagnosis: 


 Acute electrocardiogram changes





Chest pain


Qualifiers:


 Chest pain type: unspecified Qualified Code(s): R07.9 - Chest pain, unspecified





Condition: Stable





- Follow up/Referral


Referrals: 


Josiah Maria MD [Primary Care Provider] - 





- Patient Discharge Instructions





- Post Discharge Activity

## 2020-02-08 NOTE — PDOC
*Physical Exam





- Vital Signs


 Last Vital Signs











Temp Pulse Resp BP Pulse Ox


 


 98.7 F   71   19   158/79   100 


 


 02/08/20 19:16  02/08/20 19:16  02/08/20 19:16  02/08/20 19:16  02/08/20 19:16














ED Treatment Course





- LABORATORY


CBC & Chemistry Diagram: 


 02/08/20 22:00





 02/08/20 22:00





Medical Decision Making





- Medical Decision Making





02/08/20 21:15


Patient seen by the advanced practice provider under my  supervision. Ancillary 

testing reviewed as necessary.


I agree with plan as outlined by the advanced practice provider.





Discharge





- Discharge Information


Problems reviewed: Yes


Clinical Impression/Diagnosis: 


 Acute electrocardiogram changes





Chest pain


Qualifiers:


 Chest pain type: unspecified Qualified Code(s): R07.9 - Chest pain, unspecified





Condition: Good





- Follow up/Referral





- Patient Discharge Instructions





- Post Discharge Activity

## 2020-02-09 VITALS — SYSTOLIC BLOOD PRESSURE: 118 MMHG | DIASTOLIC BLOOD PRESSURE: 81 MMHG

## 2020-02-09 VITALS — HEART RATE: 60 BPM

## 2020-02-09 NOTE — HP
Admitting History and Physical





- Admission


History of Present Illness: 





Pt is a 29 year female with PMH significant for A-Fib and HTN during her last 

pregnancy(followed then at [facility]), anxiety/apnic and obesity.. Pt also had 

termination of subsequent pregnancy 12/2019. Pt now presented to ER with c/o 

palpitations - irreg heart beat, tightness in the throat since the morning.  

States was up working in the house today and in her usual state of health 

however when she went to lay down was when the symptoms started.  States when 

she was pregnant she was diagnosed with atrial fibrillation and hypertension 

which was refractory to metoprolol so was placed on nifedipine.  Patient states 

that she has not taken her nifedipine for the past 4 days because she has been 

on an herbal blend and her blood pressure has been normal. Patient also 

complains of chest tightness which is 6/10 in her chest and in her shoulder 

radiates down the left arm with a numbness in the left hand and was associated 

with shortness of breath. In the ER pt had cta chest wc was negative for PE.





- Past Medical History


Cardiovascular: Yes: AFIB, HTN


...LMP: 10/02/18





- Smoking History


Smoking history: Never smoked


Have you smoked in the past 12 months: No





- Alcohol/Substance Use


Hx Alcohol Use: No





Home Medications





- Allergies


Allergies/Adverse Reactions: 


 Allergies











Allergy/AdvReac Type Severity Reaction Status Date / Time


 


Penicillins Allergy Intermediate Hives Verified 02/08/20 21:05














- Home Medications


Home Medications: 


Ambulatory Orders





Nifedipine [Nifedipine ER] 60 mg PO DAILY 02/08/20 











Family Medical History


Family History: Unremarkable





Review of Systems





- Review of Systems


Constitutional: reports: No Symptoms


Eyes: reports: No Symptoms


HENT: reports: No Symptoms


Neck: reports: No Symptoms


Cardiovascular: reports: Chest Pain, Palpitations


Respiratory: reports: No Symptoms


Gastrointestinal: reports: No Symptoms


Genitourinary: reports: No Symptoms





Physical Examination


Vital Signs: 


 Vital Signs











Temperature  98.7 F   02/08/20 19:16


 


Pulse Rate  60   02/09/20 07:45


 


Respiratory Rate  16   02/09/20 07:45


 


Blood Pressure  118/81   02/09/20 07:45


 


O2 Sat by Pulse Oximetry (%)  100   02/09/20 07:48











Eyes: Yes: WNL


HENT: Yes: WNL


Neck: Yes: WNL, Supple


Cardiovascular: Yes: WNL, Regular Rate and Rhythm


Respiratory: Yes: WNL, Regular, CTA Bilaterally


Gastrointestinal: Yes: WNL, Normal Bowel Sounds, Soft


Musculoskeletal: Yes: WNL


Extremities: Yes: WNL


Edema: No


Neurological: Yes: WNL, Alert, Oriented


...Motor Strength: WNL


Labs: 


 CBC, BMP





 02/08/20 22:00 





 02/08/20 22:00 











Problem List





- Problems


(1) Chest pain


Assessment/Plan: 


Resolved


Troponin have been negative


DC planning as per cardio


Code(s): R07.9 - CHEST PAIN, UNSPECIFIED   


Qualifiers: 


   Chest pain type: unspecified   Qualified Code(s): R07.9 - Chest pain, 

unspecified   





(2) Palpitations


Assessment/Plan: 


Pt is in NSR


Pt to f/u w/ cardio as outpt


Pt to restart nifedipine


Code(s): R00.2 - PALPITATIONS   





(3) Elevated d-dimer


Assessment/Plan: 


CTA chest was negative for PE


Code(s): R79.89 - OTHER SPECIFIED ABNORMAL FINDINGS OF BLOOD CHEMISTRY   





(4) Severe anxiety with panic


Code(s): F41.0 - PANIC DISORDER [EPISODIC PAROXYSMAL ANXIETY]

## 2020-02-09 NOTE — PDOC
*Physical Exam





- Vital Signs


 Last Vital Signs











Temp Pulse Resp BP Pulse Ox


 


 98.7 F   72   18   136/67   100 


 


 02/08/20 19:16  02/08/20 22:37  02/08/20 22:37  02/08/20 22:37  02/08/20 22:37














ED Treatment Course





- LABORATORY


CBC & Chemistry Diagram: 


 02/08/20 22:00





 02/08/20 22:00





- ADDITIONAL ORDERS


Additional order review: 


 Laboratory  Results











  02/08/20 02/08/20 02/08/20





  22:00 22:00 22:00


 


PT with INR   12.10 


 


INR   1.03 


 


PTT (Actin FS)   36.0 


 


D-Dimer    728 H


 


Sodium   


 


Potassium   


 


Chloride   


 


Carbon Dioxide   


 


Anion Gap   


 


BUN   


 


Creatinine   


 


Est GFR (CKD-EPI)AfAm   


 


Est GFR (CKD-EPI)NonAf   


 


Random Glucose   


 


Calcium   


 


Total Bilirubin   


 


AST   


 


ALT   


 


Alkaline Phosphatase   


 


Creatine Kinase   


 


Troponin I   


 


Total Protein   


 


Albumin   


 


Beta HCG, Quant   


 


Serum Pregnancy, Qual  Positive  














  02/08/20





  22:00


 


PT with INR 


 


INR 


 


PTT (Actin FS) 


 


D-Dimer 


 


Sodium  139


 


Potassium  3.8


 


Chloride  108 H


 


Carbon Dioxide  27


 


Anion Gap  4 L


 


BUN  7.0


 


Creatinine  0.7


 


Est GFR (CKD-EPI)AfAm  136.66


 


Est GFR (CKD-EPI)NonAf  117.91


 


Random Glucose  102


 


Calcium  8.7


 


Total Bilirubin  0.3


 


AST  15


 


ALT  20


 


Alkaline Phosphatase  62


 


Creatine Kinase  138


 


Troponin I  < 0.02


 


Total Protein  7.5


 


Albumin  3.9


 


Beta HCG, Quant  50.5


 


Serum Pregnancy, Qual 








 











  02/08/20





  22:00


 


RBC  4.35


 


MCV  84.6


 


MCHC  33.2


 


RDW  13.1  D


 


MPV  9.4


 


Neutrophils %  43.8  D


 


Lymphocytes %  44.9 H D


 


Monocytes %  9.1


 


Eosinophils %  1.1  D


 


Basophils %  1.1














- RADIOLOGY


Radiology Studies Ordered: 














 Category Date Time Status


 


 CHEST CTA [CT] Stat CT Scan  02/09/20 00:12 Ordered














- Medications


Given in the ED: 


ED Medications














Discontinued Medications














Generic Name Dose Route Start Last Admin





  Trade Name Freq  PRN Reason Stop Dose Admin


 


Aspirin  325 mg  02/08/20 21:51  02/08/20 22:06





  Asa -  PO  02/08/20 21:52  325 mg





  ONCE ONE   Administration





     





     





     





     














Medical Decision Making





- Medical Decision Making


Patient is a 29 year old female with h/o A-Fib, HTN during pregnancy c/o 

palpitations - irreg heart beat, tightness in the throat and occiput 

intermittently since this morning. 





EKG with new twi in V1-V4 when compared to previous EKG





Tpn undetectable





Patient with positive pregnancy test


B-hcg 50


Pregnant until proven otherwise


Concern for clot given elevated D-dimer and risk factor of pregnancy


This is a not a desired pregnancy. She does not know how she could have become 

pregnant with an IUD. 


Patient is of sound mind and has capacity to make decisions. Benefits/risks 

explained to patient and they voiced understanding. Patient decided to go ahead 

with CTA chest to rule out PE


02/09/20 00:13





CTA Chest as read by imaging on call: "COMPARISON: None.


FINDINGS: There is no PE or dissection. Heart size is normal. The trachea and 

bronchi are


patent. There is no pleural or pericardial effusion. The lungs are clear. No 

fractures identified. The


upper abdominal structures are normal.


IMPRESSION: Normal exam"





Discussed case with Dr. Osorio who accepted patient for telemetry observation 





Discharge





- Discharge Information


Problems reviewed: Yes


Clinical Impression/Diagnosis: 


 Acute electrocardiogram changes





Chest pain


Qualifiers:


 Chest pain type: unspecified Qualified Code(s): R07.9 - Chest pain, unspecified





Condition: Guarded





- Admission


Yes





- Follow up/Referral





- Patient Discharge Instructions





- Post Discharge Activity

## 2020-02-09 NOTE — CON.CARD
Consult


Consult Specialty:: cardiology


Reason for Consultation:: palpitations; atypical chest pain; hx termination of 

pregnancy 12/2019





- History of Present Illness


Chief Complaint: Pt A&Ox3; asymptomatic presently.


History of Present Illness: 





Patient is a 29 year old black woman with h/o A-Fib, HTN during pregnancy, with 

AF RVR 06/2019 during pregnancy (followed then at [facility]), s/p termination of 

subsequent pregnancy 12/2019, obesity, anxiety/panic, now came to ER with c/o 

palpitations - irreg heart beat, tightness in the throat and occiput 

intermittently since this morning.  States was up working in the house today 

and in her usual state of health however when she went to lay down was when the 

symptoms started.  States when she was pregnant she was diagnosed with atrial 

fibrillation and hypertension which was refractory to metoprolol so was placed 

on nifedipine.  Patient states that she has not taken her nifedipine for the 

past 4 days because she has been on an herbal blend and her blood pressure has 

been normal.  Last saw the cardiologist 4 months ago.  Patient states that 

today also had tightness which is 6/10 in her chest and in her shoulder 

radiates down the left arm with a numbness in the left hand and was associated 

with shortness of breath.  Family history negative for CVA/MI/PE/DVT, no OCP, (+

) IUD.  Denies fever, chills.








PMD:  Dr. Josiah Maria


Cardiologist: Dr. Nimesh Dumont.





- History Source


History Provided By: Patient, Medical Record


Limitations to Obtaining History: No Limitations





- Past Medical History


Cardio/Vascular: Yes: AFIB, HTN


Reproductive: Yes: Other


Heme/Onc: No: Anemia


Psych: Yes: Anxiety, Panic





- Alcohol/Substance Use


Hx Alcohol Use: No





- Smoking History


Smoking history: Never smoked


Have you smoked in the past 12 months: No





Home Medications





- Allergies


Allergies/Adverse Reactions: 


 Allergies











Allergy/AdvReac Type Severity Reaction Status Date / Time


 


Penicillins Allergy Intermediate Hives Verified 02/08/20 21:05














- Home Medications


Home Medications: 


Ambulatory Orders





Clindamycin [Cleocin -] 300 mg PO TID #21 capsule 11/26/18 


Nifedipine [Nifedipine ER] 60 mg PO DAILY 02/08/20 











Family Medical History


Family History: Denies





Review of Systems





- Review of Systems


Constitutional: reports: No Symptoms


Eyes: reports: No Symptoms


HENT: reports: No Symptoms


Neck: reports: No Symptoms


Cardiovascular: reports: No Symptoms


Respiratory: reports: SOB


Gastrointestinal: reports: No Symptoms


Genitourinary: reports: No Symptoms


Breasts: reports: No Symptoms Reported


Musculoskeletal: reports: No Symptoms


Integumentary: reports: No Symptoms


Neurological: reports: No Symptoms


Endocrine: reports: No Symptoms


Hematology/Lymphatic: reports: No Symptoms


Psychiatric: reports: Anxiety





- Risk Factors


Known Risk Factors: Yes: Other


Vital Signs: 


 Vital Signs











Temperature  98.7 F   02/08/20 19:16


 


Pulse Rate  60   02/09/20 07:45


 


Respiratory Rate  16   02/09/20 07:45


 


Blood Pressure  118/81   02/09/20 07:45


 


O2 Sat by Pulse Oximetry (%)  100   02/09/20 07:48











Constitutional: Yes: Anxious


Eyes: Yes: WNL


HENT: Yes: WNL


Neck: Yes: WNL


Respiratory: Yes: WNL


Gastrointestinal: Yes: WNL


Renal/: Yes: WNL


Cardiovascular: Yes: WNL


JVD: No


Carotid Bruit: No


PMI: Non-Displaced


Heart Sounds: Yes: S1, S2


Musculoskeletal: Yes: WNL


Extremities: Yes: WNL


Edema: No


Peripheral Pulses WNL: Yes


Integumentary: Yes: WNL


Neurological: Yes: WNL


...Motor Strength: WNL


Psychiatric: Yes: Alert, Oriented, Other





- Other Data


Labs, Other Data: 


 CBC, BMP





 02/08/20 22:00 





 02/08/20 22:00 





 INR, PTT











INR  1.03  (0.83-1.09)   02/08/20  22:00    








 Troponin, BNP











  02/08/20 02/09/20 02/09/20





  22:00 03:30 10:53


 


Troponin I  < 0.02  < 0.02  < 0.02








 Troponin, BNP











  02/08/20 02/09/20 02/09/20





  22:00 03:30 10:53


 


Troponin I  < 0.02  < 0.02  < 0.02














Imaging





- Results


EKG: Image Reviewed (NSR: sinus arryhthmia)





Problem List





- Problems


(1) Palpitations


Code(s): R00.2 - PALPITATIONS   





(2) Severe anxiety with panic


Assessment/Plan: 


Pt had psychological counseling years ago for this condition. She plans to be 

see a therapist again for it.


Code(s): F41.0 - PANIC DISORDER [EPISODIC PAROXYSMAL ANXIETY]   





(3) Acute electrocardiogram changes


Assessment/Plan: 


EKG: NSR: nonspecific T wave changes.


TNI < 0.02 x 2





Plan:


From a cardiac perspective, pt may be followed as an outpatient by her 

cardiologist.


Code(s): R94.31 - ABNORMAL ELECTROCARDIOGRAM [ECG] [EKG]   





(4) Atrial fibrillation with rapid ventricular response


Assessment/Plan: 


AF with RVR 06/2019:


Pt has CHAD2s Vacc score of 0.


EKG this admission: NSR.





Incident (?one-time) of AF during pregnancy.


F/u ECHO (done as outpatient).


F/u prior workup done at [facility].


Pt to f/u workup with PMD and cardiologist.


Code(s): I48.91 - UNSPECIFIED ATRIAL FIBRILLATION   





(5) Obesity (BMI 30.0-34.9)


Code(s): E66.9 - OBESITY, UNSPECIFIED   





(6) Elevated d-dimer


Assessment/Plan: 


O2 sat 100% on RA


HR: 60 bpm; sinus rhythm.


CTA thorax: no PE


Code(s): R79.89 - OTHER SPECIFIED ABNORMAL FINDINGS OF BLOOD CHEMISTRY

## 2020-02-10 NOTE — EKG
Test Reason : 

Blood Pressure : ***/*** mmHG

Vent. Rate : 066 BPM     Atrial Rate : 066 BPM

   P-R Int : 138 ms          QRS Dur : 084 ms

    QT Int : 394 ms       P-R-T Axes : 061 045 044 degrees

   QTc Int : 413 ms

 

NORMAL SINUS RHYTHM WITH SINUS ARRHYTHMIA

NONSPECIFIC T WAVE ABNORMALITY

ABNORMAL ECG

WHEN COMPARED WITH ECG OF 25-JUN-2019 01:41,

SINUS RHYTHM HAS REPLACED ATRIAL FIBRILLATION

VENT. RATE HAS DECREASED BY  99 BPM

CRITERIA FOR ANTERIOR INFARCT ARE NO LONGER PRESENT

CRITERIA FOR INFERIOR INFARCT ARE NO LONGER PRESENT

NONSPECIFIC T WAVE ABNORMALITY, IMPROVED IN INFERIOR LEADS

T WAVE INVERSION MORE EVIDENT IN ANTERIOR LEADS

Confirmed by Sapna Sargent ([id number]) on 2/10/2020 12:44:38 PM

 

Referred By:             Confirmed By:Sapna Sargent

## 2020-06-27 ENCOUNTER — HOSPITAL ENCOUNTER (EMERGENCY)
Dept: HOSPITAL 74 - JER | Age: 30
Discharge: HOME | End: 2020-06-27
Payer: COMMERCIAL

## 2020-06-27 VITALS — HEART RATE: 74 BPM | SYSTOLIC BLOOD PRESSURE: 138 MMHG | DIASTOLIC BLOOD PRESSURE: 84 MMHG

## 2020-06-27 VITALS — BODY MASS INDEX: 27.3 KG/M2

## 2020-06-27 VITALS — TEMPERATURE: 99.1 F

## 2020-06-27 DIAGNOSIS — D25.9: Primary | ICD-10-CM

## 2020-06-27 LAB
PH UR: 7.5 [PH] (ref 5–8)
SP GR UR: 1.01 (ref 1.01–1.03)
UROBILINOGEN UR STRIP-MCNC: 0.2 MG/DL (ref 0.2–1)

## 2021-04-06 ENCOUNTER — HOSPITAL ENCOUNTER (EMERGENCY)
Dept: HOSPITAL 74 - JER | Age: 31
Discharge: HOME | End: 2021-04-06
Payer: COMMERCIAL

## 2021-04-06 VITALS — TEMPERATURE: 98.2 F | SYSTOLIC BLOOD PRESSURE: 140 MMHG | HEART RATE: 69 BPM | DIASTOLIC BLOOD PRESSURE: 88 MMHG

## 2021-04-06 VITALS — BODY MASS INDEX: 31.2 KG/M2

## 2021-04-06 DIAGNOSIS — F41.9: Primary | ICD-10-CM

## 2021-04-06 LAB
ALBUMIN SERPL-MCNC: 4 G/DL (ref 3.4–5)
ALP SERPL-CCNC: 57 U/L (ref 45–117)
ALT SERPL-CCNC: 16 U/L (ref 13–61)
AMPHET UR-MCNC: NEGATIVE NG/ML
ANION GAP SERPL CALC-SCNC: 5 MMOL/L (ref 8–16)
APPEARANCE UR: CLEAR
AST SERPL-CCNC: 13 U/L (ref 15–37)
BARBITURATES UR-MCNC: NEGATIVE NG/ML
BASOPHILS # BLD: 0.7 % (ref 0–2)
BENZODIAZ UR SCN-MCNC: NEGATIVE NG/ML
BILIRUB SERPL-MCNC: 0.8 MG/DL (ref 0.2–1)
BILIRUB UR STRIP.AUTO-MCNC: NEGATIVE MG/DL
BUN SERPL-MCNC: 9.4 MG/DL (ref 7–18)
CALCIUM SERPL-MCNC: 9.2 MG/DL (ref 8.5–10.1)
CHLORIDE SERPL-SCNC: 104 MMOL/L (ref 98–107)
CO2 SERPL-SCNC: 27 MMOL/L (ref 21–32)
COCAINE UR-MCNC: NEGATIVE NG/ML
COLOR UR: YELLOW
CREAT SERPL-MCNC: 0.7 MG/DL (ref 0.55–1.3)
DEPRECATED RDW RBC AUTO: 14 % (ref 11.6–15.6)
EOSINOPHIL # BLD: 0.7 % (ref 0–4.5)
GLUCOSE SERPL-MCNC: 88 MG/DL (ref 74–106)
HCT VFR BLD CALC: 37.6 % (ref 32.4–45.2)
HGB BLD-MCNC: 12.1 GM/DL (ref 10.7–15.3)
KETONES UR QL STRIP: (no result)
LEUKOCYTE ESTERASE UR QL STRIP.AUTO: NEGATIVE
LYMPHOCYTES # BLD: 39 % (ref 8–40)
MCH RBC QN AUTO: 27.5 PG (ref 25.7–33.7)
MCHC RBC AUTO-ENTMCNC: 32.2 G/DL (ref 32–36)
MCV RBC: 85.4 FL (ref 80–96)
METHADONE UR-MCNC: NEGATIVE NG/ML
MONOCYTES # BLD AUTO: 8.2 % (ref 3.8–10.2)
NEUTROPHILS # BLD: 51.4 % (ref 42.8–82.8)
NITRITE UR QL STRIP: NEGATIVE
OPIATES UR QL SCN: NEGATIVE NG/ML
PCP UR QL SCN: NEGATIVE NG/ML
PH UR: 5.5 [PH] (ref 5–8)
PLATELET # BLD AUTO: 337 K/MM3 (ref 134–434)
PMV BLD: 9.7 FL (ref 7.5–11.1)
POTASSIUM SERPLBLD-SCNC: 4.3 MMOL/L (ref 3.5–5.1)
PROT SERPL-MCNC: 7.9 G/DL (ref 6.4–8.2)
PROT UR QL STRIP: NEGATIVE
PROT UR QL STRIP: NEGATIVE
RBC # BLD AUTO: 4.41 M/MM3 (ref 3.6–5.2)
SODIUM SERPL-SCNC: 136 MMOL/L (ref 136–145)
SP GR UR: 1.02 (ref 1.01–1.03)
UROBILINOGEN UR STRIP-MCNC: 0.2 MG/DL (ref 0.2–1)
WBC # BLD AUTO: 5.9 K/MM3 (ref 4–10)

## 2023-06-03 ENCOUNTER — HOSPITAL ENCOUNTER (EMERGENCY)
Dept: HOSPITAL 74 - JER | Age: 33
Discharge: HOME | End: 2023-06-03
Payer: COMMERCIAL

## 2023-06-03 VITALS
TEMPERATURE: 97.5 F | SYSTOLIC BLOOD PRESSURE: 125 MMHG | DIASTOLIC BLOOD PRESSURE: 90 MMHG | RESPIRATION RATE: 18 BRPM | HEART RATE: 69 BPM

## 2023-06-03 VITALS — BODY MASS INDEX: 30.1 KG/M2

## 2023-06-03 DIAGNOSIS — R07.9: ICD-10-CM

## 2023-06-03 DIAGNOSIS — R20.2: ICD-10-CM

## 2023-06-03 DIAGNOSIS — Z20.822: ICD-10-CM

## 2023-06-03 DIAGNOSIS — M94.0: Primary | ICD-10-CM

## 2023-06-03 LAB
ALBUMIN SERPL-MCNC: 3.9 G/DL (ref 3.4–5)
ALP SERPL-CCNC: 43 U/L (ref 45–117)
ALT SERPL-CCNC: 20 U/L (ref 13–61)
ANION GAP SERPL CALC-SCNC: 4 MMOL/L (ref 8–16)
AST SERPL-CCNC: 7 U/L (ref 15–37)
BASOPHILS # BLD: 1.2 % (ref 0–2)
BILIRUB SERPL-MCNC: 0.6 MG/DL (ref 0.2–1)
BUN SERPL-MCNC: 7.1 MG/DL (ref 7–18)
CALCIUM SERPL-MCNC: 9.3 MG/DL (ref 8.5–10.1)
CHLORIDE SERPL-SCNC: 106 MMOL/L (ref 98–107)
CO2 SERPL-SCNC: 28 MMOL/L (ref 21–32)
CREAT SERPL-MCNC: 0.7 MG/DL (ref 0.55–1.3)
DEPRECATED RDW RBC AUTO: 14.8 % (ref 11.6–15.6)
EOSINOPHIL # BLD: 1.1 % (ref 0–4.5)
GLUCOSE SERPL-MCNC: 98 MG/DL (ref 74–106)
HCT VFR BLD CALC: 33.2 % (ref 32.4–45.2)
HGB BLD-MCNC: 11 GM/DL (ref 10.7–15.3)
LYMPHOCYTES # BLD: 40.2 % (ref 8–40)
MAGNESIUM SERPL-MCNC: 2.1 MG/DL (ref 1.8–2.4)
MCH RBC QN AUTO: 27 PG (ref 25.7–33.7)
MCHC RBC AUTO-ENTMCNC: 33.1 G/DL (ref 32–36)
MCV RBC: 81.4 FL (ref 80–96)
MONOCYTES # BLD AUTO: 10.8 % (ref 3.8–10.2)
NEUTROPHILS # BLD: 46.7 % (ref 42.8–82.8)
PLATELET # BLD AUTO: 379 10^3/UL (ref 134–434)
PMV BLD: 8.8 FL (ref 7.5–11.1)
POTASSIUM SERPLBLD-SCNC: 4.4 MMOL/L (ref 3.5–5.1)
PROT SERPL-MCNC: 7.4 G/DL (ref 6.4–8.2)
RBC # BLD AUTO: 4.08 M/MM3 (ref 3.6–5.2)
SODIUM SERPL-SCNC: 138 MMOL/L (ref 136–145)
WBC # BLD AUTO: 5.5 K/MM3 (ref 4–10)

## 2023-06-03 PROCEDURE — 3E0337Z INTRODUCTION OF ELECTROLYTIC AND WATER BALANCE SUBSTANCE INTO PERIPHERAL VEIN, PERCUTANEOUS APPROACH: ICD-10-PCS

## 2023-06-03 PROCEDURE — 3E033NZ INTRODUCTION OF ANALGESICS, HYPNOTICS, SEDATIVES INTO PERIPHERAL VEIN, PERCUTANEOUS APPROACH: ICD-10-PCS
